# Patient Record
Sex: FEMALE | Race: WHITE | NOT HISPANIC OR LATINO | Employment: STUDENT | URBAN - METROPOLITAN AREA
[De-identification: names, ages, dates, MRNs, and addresses within clinical notes are randomized per-mention and may not be internally consistent; named-entity substitution may affect disease eponyms.]

---
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
## 2020-02-11 ENCOUNTER — OFFICE VISIT (OUTPATIENT)
Dept: GASTROENTEROLOGY | Facility: CLINIC | Age: 20
End: 2020-02-11

## 2020-02-11 VITALS
BODY MASS INDEX: 22.79 KG/M2 | TEMPERATURE: 98.4 F | WEIGHT: 145.2 LBS | DIASTOLIC BLOOD PRESSURE: 64 MMHG | HEIGHT: 67 IN | SYSTOLIC BLOOD PRESSURE: 102 MMHG

## 2020-02-11 DIAGNOSIS — B37.0 THRUSH, ORAL: Primary | ICD-10-CM

## 2020-02-11 PROCEDURE — 99204 OFFICE O/P NEW MOD 45 MIN: CPT | Performed by: NURSE PRACTITIONER

## 2020-02-11 RX ORDER — FLUCONAZOLE 200 MG/1
TABLET ORAL
COMMUNITY
Start: 2020-02-06 | End: 2020-03-18

## 2020-02-11 NOTE — PROGRESS NOTES
Chief Complaint   Patient presents with   • Thrush     HPI    Mey Hartmann is a  19 y.o. female here to establish care as a new patient for complaints of recurrent thrush onset 1 year ago.  Patient's been seen at an urgent treatment center as well as her dentist.  She is tried 3 rounds of fluconazole (cuurently on her 3rd round), nystatin mouthwash (two weeks ago), and throat lozenges which has not completely alleviated her symptoms.  She has some irritated small ulcers around her gumline and white plaque deposits on the base of her tongue.  No nausea, vomiting, dysphagia, odynophagia, or poor appetite.  Her weight is stable.  BMI 22.7    No diarrhea, constipation, or rectal bleeding.    Patient denies inhaler use, she is not diabetic, no history of immunosuppression.    Patient does not have a primary care provider and she has not seen an ENT specialist or ID.     She denies family history colon cancer, colon polyps, ulcerative colitis, or Crohn's disease.  She has never had an endoscopic evaluation.    History reviewed. No pertinent past medical history.    History reviewed. No pertinent surgical history.    Scheduled Meds:  Outpatient Encounter Medications as of 2/11/2020   Medication Sig Dispense Refill   • fluconazole (DIFLUCAN) 200 MG tablet TAKE 1 TABLET BY MOUTH ONCE DAILY FOR 10 DAYS       No facility-administered encounter medications on file as of 2/11/2020.        Continuous Infusions:  No current facility-administered medications for this visit.     PRN Meds:.    No Known Allergies    Social History     Socioeconomic History   • Marital status: Single     Spouse name: Not on file   • Number of children: Not on file   • Years of education: Not on file   • Highest education level: Not on file   Tobacco Use   • Smoking status: Never Smoker   • Smokeless tobacco: Never Used   Substance and Sexual Activity   • Alcohol use: Yes     Comment: rare    • Drug use: Never       Family History   Problem Relation  Age of Onset   • Colon cancer Maternal Great-Grandfather        Review of Systems   Constitutional: Negative for activity change, appetite change, fatigue, fever and unexpected weight change.   HENT: Positive for mouth sores. Negative for sore throat, trouble swallowing and voice change.    Eyes: Negative for discharge and itching.   Respiratory: Negative for apnea, cough, choking, chest tightness, shortness of breath and wheezing.    Cardiovascular: Negative for chest pain, palpitations and leg swelling.   Gastrointestinal: Negative for abdominal distention, abdominal pain, anal bleeding, blood in stool, constipation, diarrhea, nausea, rectal pain and vomiting.   Endocrine: Negative for cold intolerance and heat intolerance.   Genitourinary: Negative for difficulty urinating and dysuria.   Musculoskeletal: Negative for arthralgias and neck pain.   Skin: Negative for color change and pallor.   Allergic/Immunologic: Negative for environmental allergies and food allergies.   Neurological: Negative for dizziness and syncope.   Hematological: Negative for adenopathy.   Psychiatric/Behavioral: Negative for agitation and confusion.       Vitals:    02/11/20 1445   BP: 102/64   Temp: 98.4 °F (36.9 °C)       Physical Exam   Constitutional: She is oriented to person, place, and time. She appears well-developed and well-nourished.   HENT:   Head: Normocephalic.   Mouth/Throat: Mucous membranes are normal. Oral lesions present. No oropharyngeal exudate or tonsillar abscesses. Tonsils are 1+ on the right. Tonsils are 1+ on the left. No tonsillar exudate.   Small red lesions noted at the base of lower gumline.  White plaque-like deposits noted at the base of her tongue.   Eyes: Pupils are equal, round, and reactive to light.   Neck: Normal range of motion.   Cardiovascular: Normal rate, regular rhythm and normal heart sounds.   Pulmonary/Chest: Effort normal and breath sounds normal. No respiratory distress. She has no wheezes.    Abdominal: Soft. Bowel sounds are normal. She exhibits no distension and no mass. There is no tenderness. There is no guarding. No hernia.   Musculoskeletal: Normal range of motion.   Neurological: She is alert and oriented to person, place, and time.   Skin: Skin is warm and dry. Capillary refill takes less than 2 seconds.   Psychiatric: She has a normal mood and affect. Her behavior is normal.     No radiology results for the last 7 days    Mey was seen today for thrush.    Diagnoses and all orders for this visit:    Thrush, oral    -     Ambulatory Referral to Infectious Disease      Impression:    Pleasant 19-year-old female seen today as a new patient to establish care for recurrent thrush.  Patient denies GI complaints.  No odynophagia, dysphagia, nausea, vomiting, acid reflux/heartburn, poor appetite, or weight loss to warrant endoscopic evaluation.  I did discuss patient's symptoms with Dr. Chavez.  We both agree the patient will benefit from infectious disease consultation.  Referral to [facility] infectious disease group for next available has been made.  If infectious disease feels endoscopic evaluation is warranted we are happy to oblige.  I informed the patient and her mother of plan of care and they are agreeable.    Patient also does not have a primary care provider.  Recommend she establish care with Dr. Isbell or Alis Isbell also in our building.    Further recommendations pending infectious disease opinion.      Addendum: Reviewed recent labs with H/H 13.9/42.3, platelet count 259.  BUN 9, creatinine 0.8, normal LFTs.

## 2020-03-18 ENCOUNTER — LAB (OUTPATIENT)
Dept: LAB | Facility: HOSPITAL | Age: 20
End: 2020-03-18

## 2020-03-18 ENCOUNTER — OFFICE VISIT (OUTPATIENT)
Dept: INFECTIOUS DISEASES | Facility: CLINIC | Age: 20
End: 2020-03-18

## 2020-03-18 VITALS
HEART RATE: 81 BPM | WEIGHT: 144.2 LBS | TEMPERATURE: 97.6 F | HEIGHT: 67 IN | SYSTOLIC BLOOD PRESSURE: 115 MMHG | BODY MASS INDEX: 22.63 KG/M2 | RESPIRATION RATE: 12 BRPM | DIASTOLIC BLOOD PRESSURE: 82 MMHG

## 2020-03-18 DIAGNOSIS — B37.0 ORAL THRUSH: ICD-10-CM

## 2020-03-18 DIAGNOSIS — B37.0 ORAL THRUSH: Primary | ICD-10-CM

## 2020-03-18 LAB
ALBUMIN SERPL-MCNC: 4.8 G/DL (ref 3.5–5.2)
ALBUMIN/GLOB SERPL: 2 G/DL
ALP SERPL-CCNC: 48 U/L (ref 39–117)
ALT SERPL W P-5'-P-CCNC: 15 U/L (ref 1–33)
ANION GAP SERPL CALCULATED.3IONS-SCNC: 11.4 MMOL/L (ref 5–15)
AST SERPL-CCNC: 21 U/L (ref 1–32)
BASOPHILS # BLD AUTO: 0.04 10*3/MM3 (ref 0–0.2)
BASOPHILS NFR BLD AUTO: 0.7 % (ref 0–1.5)
BILIRUB SERPL-MCNC: 0.5 MG/DL (ref 0.2–1.2)
BUN BLD-MCNC: 10 MG/DL (ref 6–20)
BUN/CREAT SERPL: 14.7 (ref 7–25)
CALCIUM SPEC-SCNC: 9.6 MG/DL (ref 8.6–10.5)
CHLORIDE SERPL-SCNC: 102 MMOL/L (ref 98–107)
CO2 SERPL-SCNC: 26.6 MMOL/L (ref 22–29)
CREAT BLD-MCNC: 0.68 MG/DL (ref 0.57–1)
DEPRECATED RDW RBC AUTO: 38.4 FL (ref 37–54)
EOSINOPHIL # BLD AUTO: 0.14 10*3/MM3 (ref 0–0.4)
EOSINOPHIL NFR BLD AUTO: 2.4 % (ref 0.3–6.2)
ERYTHROCYTE [DISTWIDTH] IN BLOOD BY AUTOMATED COUNT: 12.1 % (ref 12.3–15.4)
GFR SERPL CREATININE-BSD FRML MDRD: 111 ML/MIN/1.73
GLOBULIN UR ELPH-MCNC: 2.4 GM/DL
GLUCOSE BLD-MCNC: 88 MG/DL (ref 65–99)
HCT VFR BLD AUTO: 43.7 % (ref 34–46.6)
HGB BLD-MCNC: 14.2 G/DL (ref 12–15.9)
HIV1+2 AB SER QL: NORMAL
IMM GRANULOCYTES # BLD AUTO: 0.01 10*3/MM3 (ref 0–0.05)
IMM GRANULOCYTES NFR BLD AUTO: 0.2 % (ref 0–0.5)
LYMPHOCYTES # BLD AUTO: 2.4 10*3/MM3 (ref 0.7–3.1)
LYMPHOCYTES NFR BLD AUTO: 40.8 % (ref 19.6–45.3)
MCH RBC QN AUTO: 28 PG (ref 26.6–33)
MCHC RBC AUTO-ENTMCNC: 32.5 G/DL (ref 31.5–35.7)
MCV RBC AUTO: 86 FL (ref 79–97)
MONOCYTES # BLD AUTO: 0.65 10*3/MM3 (ref 0.1–0.9)
MONOCYTES NFR BLD AUTO: 11.1 % (ref 5–12)
NEUTROPHILS # BLD AUTO: 2.64 10*3/MM3 (ref 1.7–7)
NEUTROPHILS NFR BLD AUTO: 44.8 % (ref 42.7–76)
NRBC BLD AUTO-RTO: 0 /100 WBC (ref 0–0.2)
PLATELET # BLD AUTO: 233 10*3/MM3 (ref 140–450)
PMV BLD AUTO: 10.4 FL (ref 6–12)
POTASSIUM BLD-SCNC: 4 MMOL/L (ref 3.5–5.2)
PROT SERPL-MCNC: 7.2 G/DL (ref 6–8.5)
RBC # BLD AUTO: 5.08 10*6/MM3 (ref 3.77–5.28)
RPR SER QL: NORMAL
SODIUM BLD-SCNC: 140 MMOL/L (ref 136–145)
WBC NRBC COR # BLD: 5.88 10*3/MM3 (ref 3.4–10.8)

## 2020-03-18 PROCEDURE — 36415 COLL VENOUS BLD VENIPUNCTURE: CPT

## 2020-03-18 PROCEDURE — G0432 EIA HIV-1/HIV-2 SCREEN: HCPCS

## 2020-03-18 PROCEDURE — 85025 COMPLETE CBC W/AUTO DIFF WBC: CPT

## 2020-03-18 PROCEDURE — 82784 ASSAY IGA/IGD/IGG/IGM EACH: CPT

## 2020-03-18 PROCEDURE — 99243 OFF/OP CNSLTJ NEW/EST LOW 30: CPT | Performed by: INTERNAL MEDICINE

## 2020-03-18 PROCEDURE — 86592 SYPHILIS TEST NON-TREP QUAL: CPT

## 2020-03-18 PROCEDURE — 80053 COMPREHEN METABOLIC PANEL: CPT

## 2020-03-18 PROCEDURE — 82785 ASSAY OF IGE: CPT

## 2020-03-20 LAB
IGA SERPL-MCNC: 167 MG/DL (ref 87–352)
IGG SERPL-MCNC: 995 MG/DL (ref 549–1584)
IGM SERPL-MCNC: 298 MG/DL (ref 58–230)
TOTAL IGE SMQN RAST: 14 IU/ML (ref 6–495)

## 2020-03-23 ENCOUNTER — TELEPHONE (OUTPATIENT)
Dept: INFECTIOUS DISEASES | Facility: CLINIC | Age: 20
End: 2020-03-23

## 2020-03-23 DIAGNOSIS — B37.0 THRUSH: Primary | ICD-10-CM

## 2020-03-23 NOTE — PROGRESS NOTES
Referring Provider: Yolanda Pereira, APRN  [id number]  [address] [address]  Reason for clinic visits: Initial infectious disease clinic visit for recurrent thrush    HPI: Mey Hartmann is a 19 y.o. female who presents to the infectious disease clinic with complains of recurrent thrush.  Patient states she has been experiencing this problem for the past year.  She states she notices a discomfort in her mouth which is followed by white plaques seen on her tongue and gumline.  She was evaluated by her dentist and an urgent care center and diagnosed with thrush.  Initially she was treated with fluconazole 100 mg p.o. for at least a week which resolved her symptoms.  Over the past year she has received at least 3 total courses of fluconazole.  She has also tried nystatin and Clotrimazole torches with no benefit.  Her last treatment course was with fluconazole 200 mg p.o. daily.  She has been off of fluconazole for the last several weeks and has done well and currently does not have any active lesions.  She states that she never has had a culture done.  She denies any chronic medical problems.  She does not have a history of recurrent infections.  Her last antibiotic use was over a year ago and she only took 1 pill.  She denies any steroid use including no nasal steroids.  She denies any herbal supplements.  She denies any tobacco use.  She denies any other substance use.  She denies any fevers chills or night sweats with her symptoms.  She denies any swallowing difficulties.    PMH  None    No history of frequent respiratory or sinus infection    Social History   reports that she has never smoked. She has never used smokeless tobacco. She reports that she drinks alcohol. She reports that she does not use drugs.    Family History  family history includes Colon cancer in her maternal great-grandfather.    No Known Allergies    The medication list has been reviewed and updated.     Review of  Systems  Pertinent items are noted in HPI, all other systems reviewed and negative    Vital Signs   Vitals:    03/18/20 1305   BP: 115/82   Pulse: 81   Resp: 12   Temp: 97.6 °F (36.4 °C)       Physical Exam:   General: In no acute distress  HEENT: Normocephalic, atraumatic, PERRL,  no scleral icterus. Oropharynx is clear and moist no thrush  Neck: Supple, trachea is midline  Cardiovascular: Normal rate, regular rhythm, erickson S1 and S2, no murmurs, rubs, or gallops    Respiratory: Lungs are clear to ascultation bilaterally,    GI: Abdomen is soft, non-tender, non-distended, positive bowel sounds bilaterall  Musculoskeletal:no edema, tenderness or deformity  Skin: No rashes   LE: no E/C/C  Neurological: Alert and oriented, moving all 4 extremities  Psychiatric: Normal mood and affect     Lab Results   Component Value Date    WBC 5.88 03/18/2020    HGB 14.2 03/18/2020    HCT 43.7 03/18/2020    MCV 86.0 03/18/2020     03/18/2020       Lab Results   Component Value Date    GLUCOSE 88 03/18/2020    BUN 10 03/18/2020    CREATININE 0.68 03/18/2020    EGFRIFNONA 111 03/18/2020    BCR 14.7 03/18/2020    CO2 26.6 03/18/2020    CALCIUM 9.6 03/18/2020    ALBUMIN 4.80 03/18/2020    AST 21 03/18/2020    ALT 15 03/18/2020     Assessment:  This is a 19 y.o. female who presents to clinic today for evaluation of recurrent thrush.  I have reviewed her history extensively and I certainly do not see any risk factors for recurrent thrush.  At this time I recommend obtaining a baseline immunological work-up as well as ruling her out for HIV and syphilis.  She currently does not have any evidence of thrush but the photos she showed me were certainly consistent with this.  I have asked her to come back to the infectious disease clinic if her thrush recurs.  At that time we can examine her as well as obtain a fungal culture     Plan:   1.  Obtain baseline labs with a CBC with differential and CMP  2.  Check serum immunoglobulin  levels  3.  Check HIV and a RPR    Return to Infectious Disease clinic prn    Time: More than 50% of time spent in counseling and coordination of care:  Total face-to-face/floor time 45 min.  Time spent in counseling 45 min. Counseling included the following topics: Differential diagnosis of mouth lesions, possible treatment options for thrush possible etiology of thrush

## 2020-03-23 NOTE — TELEPHONE ENCOUNTER
Pts mother states that the patient will come in tomorrow between 2-4 for swab.      ----- Message from Liliana Clayton MD sent at 3/23/2020  1:46 PM EDT -----    I cannot remember if you were there when we discussed this patient.  Can you call her and tell her to come in at any time that is convenient for you and or Pamela.  I put in the order for a fungal culture.  Once you guys collect it can you please just walk it over to the lab so she does not have to go through the screening etc.  Thanks    ----- Message -----  From: Gail Moreno  Sent: 3/23/2020   9:04 AM EDT  To: Liliana Clayton MD    Pt's mother Lennie called stating Mey has full blown thrush again.  She said you would have her come in to get it swabbed.  Please put in order and advise so I can inform her it is in.    Lennie:  [phone]

## 2020-03-24 ENCOUNTER — LAB (OUTPATIENT)
Dept: LAB | Facility: HOSPITAL | Age: 20
End: 2020-03-24

## 2020-03-24 DIAGNOSIS — B37.0 THRUSH: ICD-10-CM

## 2020-03-24 PROCEDURE — 87102 FUNGUS ISOLATION CULTURE: CPT

## 2020-04-21 LAB — FUNGUS WND CULT: NORMAL

## 2021-04-16 ENCOUNTER — BULK ORDERING (OUTPATIENT)
Dept: CASE MANAGEMENT | Facility: OTHER | Age: 21
End: 2021-04-16

## 2021-04-16 DIAGNOSIS — Z23 IMMUNIZATION DUE: ICD-10-CM
